# Patient Record
Sex: FEMALE | Race: WHITE | NOT HISPANIC OR LATINO | URBAN - METROPOLITAN AREA
[De-identification: names, ages, dates, MRNs, and addresses within clinical notes are randomized per-mention and may not be internally consistent; named-entity substitution may affect disease eponyms.]

---
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
## 2024-06-29 ENCOUNTER — APPOINTMENT (OUTPATIENT)
Dept: PEDIATRICS | Facility: CLINIC | Age: 19
End: 2024-06-29
Payer: COMMERCIAL

## 2024-07-09 PROBLEM — Z86.16 HISTORY OF SEVERE ACUTE RESPIRATORY SYNDROME CORONAVIRUS 2 (SARS-COV-2) DISEASE: Status: RESOLVED | Noted: 2021-12-14 | Resolved: 2024-07-09

## 2024-07-11 ENCOUNTER — LAB (OUTPATIENT)
Dept: LAB | Facility: LAB | Age: 19
End: 2024-07-11
Payer: COMMERCIAL

## 2024-07-11 ENCOUNTER — APPOINTMENT (OUTPATIENT)
Dept: PEDIATRICS | Facility: CLINIC | Age: 19
End: 2024-07-11
Payer: COMMERCIAL

## 2024-07-11 VITALS — TEMPERATURE: 98.5 F | BODY MASS INDEX: 27.86 KG/M2 | WEIGHT: 154.8 LBS

## 2024-07-11 DIAGNOSIS — Z13.220 LIPID SCREENING: ICD-10-CM

## 2024-07-11 DIAGNOSIS — R10.33 PERIUMBILICAL ABDOMINAL PAIN: ICD-10-CM

## 2024-07-11 DIAGNOSIS — R10.33 PERIUMBILICAL ABDOMINAL PAIN: Primary | ICD-10-CM

## 2024-07-11 DIAGNOSIS — R19.4 FREQUENT BOWEL MOVEMENTS: ICD-10-CM

## 2024-07-11 LAB
ALBUMIN SERPL BCP-MCNC: 4.4 G/DL (ref 3.4–5)
ALP SERPL-CCNC: 82 U/L (ref 33–110)
ALT SERPL W P-5'-P-CCNC: 24 U/L (ref 7–45)
ANION GAP SERPL CALC-SCNC: 16 MMOL/L (ref 10–20)
AST SERPL W P-5'-P-CCNC: 14 U/L (ref 9–39)
BILIRUB SERPL-MCNC: 0.4 MG/DL (ref 0–1.2)
BUN SERPL-MCNC: 8 MG/DL (ref 6–23)
CALCIUM SERPL-MCNC: 9.7 MG/DL (ref 8.6–10.6)
CHLORIDE SERPL-SCNC: 101 MMOL/L (ref 98–107)
CHOLEST SERPL-MCNC: 164 MG/DL (ref 0–199)
CHOLESTEROL/HDL RATIO: 3.3
CO2 SERPL-SCNC: 25 MMOL/L (ref 21–32)
CREAT SERPL-MCNC: 0.58 MG/DL (ref 0.5–1.05)
CRP SERPL-MCNC: 1.3 MG/DL
EGFRCR SERPLBLD CKD-EPI 2021: >90 ML/MIN/1.73M*2
FERRITIN SERPL-MCNC: 33 NG/ML (ref 8–150)
GLUCOSE SERPL-MCNC: 78 MG/DL (ref 74–99)
HDLC SERPL-MCNC: 50 MG/DL
IRON SATN MFR SERPL: 19 % (ref 25–45)
IRON SERPL-MCNC: 81 UG/DL (ref 35–150)
LDLC SERPL CALC-MCNC: 91 MG/DL
NON HDL CHOLESTEROL: 114 MG/DL (ref 0–119)
POTASSIUM SERPL-SCNC: 4.8 MMOL/L (ref 3.5–5.3)
PROT SERPL-MCNC: 7.7 G/DL (ref 6.4–8.2)
SODIUM SERPL-SCNC: 137 MMOL/L (ref 136–145)
TIBC SERPL-MCNC: 433 UG/DL (ref 240–445)
TRIGL SERPL-MCNC: 117 MG/DL (ref 0–149)
UIBC SERPL-MCNC: 352 UG/DL (ref 110–370)
VLDL: 23 MG/DL (ref 0–40)

## 2024-07-11 PROCEDURE — 1036F TOBACCO NON-USER: CPT | Performed by: PEDIATRICS

## 2024-07-11 PROCEDURE — 85652 RBC SED RATE AUTOMATED: CPT

## 2024-07-11 PROCEDURE — 82784 ASSAY IGA/IGD/IGG/IGM EACH: CPT

## 2024-07-11 PROCEDURE — 82728 ASSAY OF FERRITIN: CPT

## 2024-07-11 PROCEDURE — 84443 ASSAY THYROID STIM HORMONE: CPT

## 2024-07-11 PROCEDURE — 99214 OFFICE O/P EST MOD 30 MIN: CPT | Performed by: PEDIATRICS

## 2024-07-11 PROCEDURE — 83550 IRON BINDING TEST: CPT

## 2024-07-11 PROCEDURE — 85025 COMPLETE CBC W/AUTO DIFF WBC: CPT

## 2024-07-11 PROCEDURE — 36415 COLL VENOUS BLD VENIPUNCTURE: CPT

## 2024-07-11 PROCEDURE — 83516 IMMUNOASSAY NONANTIBODY: CPT

## 2024-07-11 PROCEDURE — 80053 COMPREHEN METABOLIC PANEL: CPT

## 2024-07-11 PROCEDURE — 86140 C-REACTIVE PROTEIN: CPT

## 2024-07-11 PROCEDURE — 83540 ASSAY OF IRON: CPT

## 2024-07-11 PROCEDURE — 80061 LIPID PANEL: CPT

## 2024-07-11 ASSESSMENT — ENCOUNTER SYMPTOMS
CONSTIPATION: 0
VOMITING: 0
HEADACHES: 0
NAUSEA: 0
ABDOMINAL PAIN: 1
BELCHING: 0
FLATUS: 1
ARTHRALGIAS: 0
FEVER: 0
DYSURIA: 0

## 2024-07-11 NOTE — PROGRESS NOTES
Dr goodman on the phone with dr gordon   Subjective   Karla Flor is a 18 y.o. female who presents for Abdominal Pain (Stomach pain for about a year/has gotten worse in the past month/Here by herself).  Today she is accompanied by caregiver who is also providing history.    Abdominal Pain  This is a chronic problem. Episode onset: abdominal discomfort started about a year ago and  increased frequency of stools started and increased symptoms with eating started about 6 months ago. The onset quality is gradual. The problem occurs daily. The problem has been gradually worsening since onset. The pain is located in the periumbilical region (all over). The pain is moderate. The quality of the pain is described as aching and cramping (bloating). The pain does not radiate. Associated symptoms include flatus. Pertinent negatives include no arthralgias, belching, constipation, dysuria, fever, headaches, melena, nausea or vomiting. (Looser stools 5+ times per day.  No blood or mucous.    No heartburn or reflux sxs.    Regular periods.  No vaginal discharge. ) The symptoms are relieved by bowel movements (cut out milk and it didn't make a difference). (Dietary habits: is pescatarian for 6 months; eats fish q week, eats eggs q week; no tofu or soy, no high protein grains; minimal nuts; no dairy) Past treatments include nothing. There is no past medical history of GERD.       Objective     Temp 36.9 °C (98.5 °F) (Tympanic)   Wt 70.2 kg (154 lb 12.8 oz)   BMI 27.86 kg/m²     Physical Exam  Vitals reviewed.   Constitutional:       Appearance: Normal appearance.   HENT:      Right Ear: Tympanic membrane normal.      Left Ear: Tympanic membrane normal.      Nose: Nose normal.      Mouth/Throat:      Mouth: Mucous membranes are moist.   Eyes:      Conjunctiva/sclera: Conjunctivae normal.   Cardiovascular:      Rate and Rhythm: Normal rate and regular rhythm.      Heart sounds: Normal heart sounds.   Pulmonary:      Effort: Pulmonary effort is normal.      Breath sounds:  Normal breath sounds.   Abdominal:      General: Abdomen is flat.      Palpations: Abdomen is soft. There is no mass.   Musculoskeletal:      Cervical back: Normal range of motion.   Skin:     General: Skin is warm.      Findings: No rash.   Neurological:      Mental Status: She is alert and oriented to person, place, and time.   Psychiatric:         Mood and Affect: Mood normal.         Assessment/Plan   Karla was seen today for abdominal pain.  Diagnoses and all orders for this visit:  Periumbilical abdominal pain (Primary)  -     CBC and Auto Differential; Future  -     Comprehensive Metabolic Panel; Future  -     C-Reactive Protein; Future  -     IgA; Future  -     Sedimentation Rate; Future  -     Tissue Transglutaminase IgA; Future  -     Ferritin; Future  -     Iron and TIBC; Future  -     TSH with reflex to Free T4 if abnormal; Future  Lipid screening  -     Lipid Panel; Future  Frequent bowel movements  My suspicion is that the blood work is going to come back normal.  I feel that she should see a dietician because her current diet is inadequate in several ways.  I'm not convinced that improving her diet is going to completely cure her abdominal symptoms so Integrative Medicine is a reasonable route as well.    Dieticians and Nutritionists:  [facility]  Joselyn [place]  [phone]  [facility]  [phone]  [place]  Madiha Esquivel  [phone]  [place]            [facility]      [phone] or [phone]    I will call with lab results.  I spent a total of 40 minutes on the date of service which included preparing to see the patient, face to face patient care, completing clinical documentation, obtaining and/or reviewing separately reviewed history, performing a medically appropriate examination, counseling and educating the patient/family/caregiver and ordering medications, tests, or procedures.

## 2024-07-12 LAB
BASOPHILS # BLD AUTO: 0.05 X10*3/UL (ref 0–0.1)
BASOPHILS NFR BLD AUTO: 0.5 %
EOSINOPHIL # BLD AUTO: 0.03 X10*3/UL (ref 0–0.7)
EOSINOPHIL NFR BLD AUTO: 0.3 %
ERYTHROCYTE [DISTWIDTH] IN BLOOD BY AUTOMATED COUNT: 12.1 % (ref 11.5–14.5)
ERYTHROCYTE [SEDIMENTATION RATE] IN BLOOD BY WESTERGREN METHOD: 43 MM/H (ref 0–20)
HCT VFR BLD AUTO: 44 % (ref 36–46)
HGB BLD-MCNC: 14.2 G/DL (ref 12–16)
IGA SERPL-MCNC: 252 MG/DL (ref 70–400)
IMM GRANULOCYTES # BLD AUTO: 0.03 X10*3/UL (ref 0–0.7)
IMM GRANULOCYTES NFR BLD AUTO: 0.3 % (ref 0–0.9)
LYMPHOCYTES # BLD AUTO: 2.64 X10*3/UL (ref 1.2–4.8)
LYMPHOCYTES NFR BLD AUTO: 23.9 %
MCH RBC QN AUTO: 29 PG (ref 26–34)
MCHC RBC AUTO-ENTMCNC: 32.3 G/DL (ref 32–36)
MCV RBC AUTO: 90 FL (ref 80–100)
MONOCYTES # BLD AUTO: 0.53 X10*3/UL (ref 0.1–1)
MONOCYTES NFR BLD AUTO: 4.8 %
NEUTROPHILS # BLD AUTO: 7.78 X10*3/UL (ref 1.2–7.7)
NEUTROPHILS NFR BLD AUTO: 70.2 %
NRBC BLD-RTO: 0 /100 WBCS (ref 0–0)
PLATELET # BLD AUTO: 433 X10*3/UL (ref 150–450)
RBC # BLD AUTO: 4.89 X10*6/UL (ref 4–5.2)
TSH SERPL-ACNC: 1.13 MIU/L (ref 0.44–3.98)
TTG IGA SER IA-ACNC: <1 U/ML
WBC # BLD AUTO: 11.1 X10*3/UL (ref 4.4–11.3)

## 2024-07-19 ENCOUNTER — TELEPHONE (OUTPATIENT)
Dept: PEDIATRICS | Facility: CLINIC | Age: 19
End: 2024-07-19
Payer: COMMERCIAL

## 2024-07-19 DIAGNOSIS — R19.7 DIARRHEA, UNSPECIFIED TYPE: ICD-10-CM

## 2024-07-19 DIAGNOSIS — R10.33 PERIUMBILICAL ABDOMINAL PAIN: Primary | ICD-10-CM

## 2024-07-31 ENCOUNTER — OFFICE VISIT (OUTPATIENT)
Dept: GASTROENTEROLOGY | Facility: CLINIC | Age: 19
End: 2024-07-31
Payer: COMMERCIAL

## 2024-07-31 VITALS
HEIGHT: 62 IN | BODY MASS INDEX: 29.26 KG/M2 | HEART RATE: 75 BPM | RESPIRATION RATE: 16 BRPM | DIASTOLIC BLOOD PRESSURE: 77 MMHG | SYSTOLIC BLOOD PRESSURE: 121 MMHG | OXYGEN SATURATION: 98 % | WEIGHT: 159 LBS

## 2024-07-31 DIAGNOSIS — R10.33 PERIUMBILICAL ABDOMINAL PAIN: ICD-10-CM

## 2024-07-31 DIAGNOSIS — R14.0 BLOATING: ICD-10-CM

## 2024-07-31 DIAGNOSIS — R19.5 LOOSE STOOLS: Primary | ICD-10-CM

## 2024-07-31 PROCEDURE — 3008F BODY MASS INDEX DOCD: CPT | Performed by: STUDENT IN AN ORGANIZED HEALTH CARE EDUCATION/TRAINING PROGRAM

## 2024-07-31 PROCEDURE — 1036F TOBACCO NON-USER: CPT | Performed by: STUDENT IN AN ORGANIZED HEALTH CARE EDUCATION/TRAINING PROGRAM

## 2024-07-31 PROCEDURE — 99204 OFFICE O/P NEW MOD 45 MIN: CPT | Performed by: STUDENT IN AN ORGANIZED HEALTH CARE EDUCATION/TRAINING PROGRAM

## 2024-07-31 NOTE — PROGRESS NOTES
"CC: Abdominal pain, bloating, loose stools/frequent BMs,     History of Present Illness:   Karla Flor is a 18 y.o. female with no significant PMH who presents to clinic for abdominal pain, bloating, and frequent bowel movements/loose stools.  She will have 4-6 bowel movements daily.  They are mushy in consistency.  She has generalized abdominal pain.  It sometimes improves with a bowel movement.  Celiac studies were normal. Serum inflammatory markers were elevated. Stress levels are mild to moderate. No anxiety. Does not consume dairy, because it causes symptoms. Drinks a \"healthy version\" of pop (twice weekly). No juice. Eats fruits and vegetables. Has not tried any medications. Not on any medications at baseline except birth control. 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  Drinks on occasion at school. No tobacco use.     Review of Systems  ROS Negative unless otherwise stated above.    Past Medical/Surgical History  Past Medical History:   Diagnosis Date    History of severe acute respiratory syndrome coronavirus 2 (SARS-CoV-2) disease 12/14/2021      No past surgical history on file.     Social History   reports that she has never smoked. She has never used smokeless tobacco.     Family History  family history is not on file.     Allergies  No Known Allergies    Medications  Current Outpatient Medications   Medication Instructions    Nortrel 0.5/35, 28, 0.5-35 mg-mcg tablet 1 tablet, oral, Daily        Objective   Visit Vitals  /77   Pulse 75   Resp 16        General: A&Ox3, NAD.  HEENT: AT/NC.   CV: RRR. No murmur.  Resp: CTA bilaterally. No wheezing, rhonchi or rales.   GI: Soft, NT/ND.   Extrem: No edema. Pulses intact.  Skin: No Jaundice.   Neuro: No focal deficits.   Psych: Normal mood and affect.     Lab Results   Component Value Date    WBC 11.1 07/11/2024    HGB 14.2 07/11/2024    HCT 44.0 07/11/2024    MCV 90 07/11/2024     07/11/2024       Chemistry    Lab " Results   Component Value Date/Time     07/11/2024 1520    K 4.8 07/11/2024 1520     07/11/2024 1520    CO2 25 07/11/2024 1520    BUN 8 07/11/2024 1520    CREATININE 0.58 07/11/2024 1520    Lab Results   Component Value Date/Time    CALCIUM 9.7 07/11/2024 1520    ALKPHOS 82 07/11/2024 1520    AST 14 07/11/2024 1520    ALT 24 07/11/2024 1520    BILITOT 0.4 07/11/2024 1520             ASSESSMENT/PLAN  Karla Flor is a 18 y.o. female with no significant PMH who presents to clinic for abdominal pain, bloating, and frequent bowel movements/loose stools.  Normal celiac studies, no anemia.  Serum inflammatory markers are elevated.    -Follow-up stool infectious studies.  -Obtain fecal calprotectin.  -Start fiber supplementation 1-2 scoops daily and titrate as needed.  -Keep food journal.  -Consider low FODMAP diet.  -Avoid pop, dairy, concentrated sugars, carbonated beverages.  -Stress reduction, good sleep hygiene, daily exercise.  -Consider colonoscopy pending clinical course.      Haja Mathias, DO

## 2024-08-02 ENCOUNTER — LAB (OUTPATIENT)
Dept: LAB | Facility: LAB | Age: 19
End: 2024-08-02
Payer: COMMERCIAL

## 2024-08-02 DIAGNOSIS — R19.7 DIARRHEA, UNSPECIFIED TYPE: ICD-10-CM

## 2024-08-02 DIAGNOSIS — R14.0 BLOATING: ICD-10-CM

## 2024-08-02 DIAGNOSIS — R19.5 LOOSE STOOLS: ICD-10-CM

## 2024-08-02 DIAGNOSIS — R10.33 PERIUMBILICAL ABDOMINAL PAIN: ICD-10-CM

## 2024-08-02 PROCEDURE — 87329 GIARDIA AG IA: CPT

## 2024-08-02 PROCEDURE — 87506 IADNA-DNA/RNA PROBE TQ 6-11: CPT

## 2024-08-02 PROCEDURE — 83993 ASSAY FOR CALPROTECTIN FECAL: CPT

## 2024-08-05 LAB

## 2024-08-06 LAB — G LAMBLIA AG STL QL IA: NEGATIVE

## 2024-08-07 LAB — CALPROTECTIN STL-MCNT: <5 UG/G

## 2024-08-09 ENCOUNTER — APPOINTMENT (OUTPATIENT)
Dept: PEDIATRICS | Facility: CLINIC | Age: 19
End: 2024-08-09
Payer: COMMERCIAL

## 2024-09-06 ENCOUNTER — APPOINTMENT (OUTPATIENT)
Dept: INTEGRATIVE MEDICINE | Facility: HOSPITAL | Age: 19
End: 2024-09-06
Payer: COMMERCIAL

## 2024-12-11 DIAGNOSIS — N94.6 DYSMENORRHEA: ICD-10-CM

## 2024-12-14 RX ORDER — NORETHINDRONE AND ETHINYL ESTRADIOL 0.5-0.035
1 KIT ORAL DAILY
Qty: 84 TABLET | Refills: 0 | Status: SHIPPED | OUTPATIENT
Start: 2024-12-14

## 2024-12-18 ENCOUNTER — TELEPHONE (OUTPATIENT)
Dept: PEDIATRICS | Facility: CLINIC | Age: 19
End: 2024-12-18
Payer: COMMERCIAL

## 2024-12-19 RX ORDER — FLUCONAZOLE 150 MG/1
TABLET ORAL
COMMUNITY
Start: 2024-11-13

## 2024-12-19 RX ORDER — CIPROFLOXACIN AND DEXAMETHASONE 3; 1 MG/ML; MG/ML
SUSPENSION/ DROPS AURICULAR (OTIC)
COMMUNITY
Start: 2024-11-10

## 2024-12-19 RX ORDER — CEPHALEXIN 500 MG/1
CAPSULE ORAL
COMMUNITY
Start: 2024-09-28

## 2024-12-20 ENCOUNTER — OFFICE VISIT (OUTPATIENT)
Dept: PEDIATRICS | Facility: CLINIC | Age: 19
End: 2024-12-20
Payer: COMMERCIAL

## 2024-12-20 VITALS — TEMPERATURE: 98.2 F | BODY MASS INDEX: 28.61 KG/M2 | WEIGHT: 156.4 LBS

## 2024-12-20 DIAGNOSIS — H61.893 EAR CANAL DRYNESS, BILATERAL: Primary | ICD-10-CM

## 2024-12-20 PROCEDURE — 99213 OFFICE O/P EST LOW 20 MIN: CPT | Performed by: PEDIATRICS

## 2024-12-20 NOTE — PROGRESS NOTES
Subjective   Karla Flor is a 19 y.o. female who presents for Other (Here by herself /C/O Ear Ache ).  Today she is accompanied by caregiver who is also providing history.    In September went to [facility].  Treated.  In October went to  - swollen LAC gland and EO on right.  Drops.  Sometimes has itchy canals.   Left ear intermittently pops.  Currently well.  No fever.  No uri. No allergies.        Objective     Temp 36.8 °C (98.2 °F) (Tympanic)   Wt 70.9 kg (156 lb 6.4 oz)   BMI 28.61 kg/m²     Physical Exam  Vitals reviewed.   Constitutional:       Appearance: Normal appearance.   HENT:      Right Ear: Tympanic membrane normal.      Left Ear: Tympanic membrane normal.      Nose: Nose normal.      Mouth/Throat:      Mouth: Mucous membranes are moist.   Eyes:      Conjunctiva/sclera: Conjunctivae normal.   Cardiovascular:      Rate and Rhythm: Normal rate and regular rhythm.      Heart sounds: Normal heart sounds.   Pulmonary:      Effort: Pulmonary effort is normal.      Breath sounds: Normal breath sounds.   Abdominal:      General: Abdomen is flat.      Palpations: Abdomen is soft. There is no mass.   Musculoskeletal:      Cervical back: Normal range of motion.   Skin:     General: Skin is warm.      Findings: No rash.   Neurological:      Mental Status: She is alert and oriented to person, place, and time.   Psychiatric:         Mood and Affect: Mood normal.         Assessment/Plan   Karla was seen today for other.  Diagnoses and all orders for this visit:  Ear canal dryness, bilateral (Primary)  Can use alcohol/white vinegar mix.  Keep in canals x 5 minutes.  Do q week. Discussed lymph node - which is not concerning.  Discussed ear popping - try and stop using straw water bottle.

## 2025-02-17 ENCOUNTER — APPOINTMENT (OUTPATIENT)
Dept: PEDIATRICS | Facility: CLINIC | Age: 20
End: 2025-02-17
Payer: COMMERCIAL

## 2025-02-17 VITALS
HEIGHT: 63 IN | HEART RATE: 88 BPM | WEIGHT: 163.19 LBS | BODY MASS INDEX: 28.91 KG/M2 | SYSTOLIC BLOOD PRESSURE: 121 MMHG | DIASTOLIC BLOOD PRESSURE: 86 MMHG

## 2025-02-17 DIAGNOSIS — Z13.9 SCREENING PROCEDURE: ICD-10-CM

## 2025-02-17 DIAGNOSIS — Z00.00 WELLNESS EXAMINATION: Primary | ICD-10-CM

## 2025-02-17 DIAGNOSIS — N94.6 DYSMENORRHEA: ICD-10-CM

## 2025-02-17 DIAGNOSIS — N92.0 MENORRHAGIA WITH REGULAR CYCLE: ICD-10-CM

## 2025-02-17 DIAGNOSIS — Z23 NEED FOR INFLUENZA VACCINATION: ICD-10-CM

## 2025-02-17 PROCEDURE — 96127 BRIEF EMOTIONAL/BEHAV ASSMT: CPT | Performed by: PEDIATRICS

## 2025-02-17 PROCEDURE — 90471 IMMUNIZATION ADMIN: CPT | Performed by: PEDIATRICS

## 2025-02-17 PROCEDURE — 99395 PREV VISIT EST AGE 18-39: CPT | Performed by: PEDIATRICS

## 2025-02-17 PROCEDURE — 90656 IIV3 VACC NO PRSV 0.5 ML IM: CPT | Performed by: PEDIATRICS

## 2025-02-17 PROCEDURE — 1036F TOBACCO NON-USER: CPT | Performed by: PEDIATRICS

## 2025-02-17 PROCEDURE — 3008F BODY MASS INDEX DOCD: CPT | Performed by: PEDIATRICS

## 2025-02-17 RX ORDER — NORETHINDRONE AND ETHINYL ESTRADIOL 0.5-0.035
1 KIT ORAL DAILY
Qty: 84 TABLET | Refills: 3 | Status: SHIPPED | OUTPATIENT
Start: 2025-02-17

## 2025-02-17 RX ORDER — FLUTICASONE PROPIONATE 0.5 MG/G
CREAM TOPICAL
COMMUNITY
Start: 2024-12-20

## 2025-02-17 NOTE — PROGRESS NOTES
"Subjective   History was provided by Karla.  Karla Flor is a 19 y.o. female who is here for this well visit.      Current Issues:  Current concerns: saw derm today.  Would like to find some birth control other than OCP.  Just tired of taking the pill every day.   Vision or hearing concerns? no  Dental care up to date? Yes- brushes teeth 2 times/day, regular dental visits, does floss teeth   No significant recent health issues.   Specialist visits - none    Review of Nutrition, Elimination, and Sleep:  Current diet:  3 meals/day, diet well balanced, normal portions, <8oz. sugar containing beverages daily, adequate dairy intake, diet includes fruits and vegetables and protein.  Elimination: normal bowel movement frequency, normal consistency   Sleep: has structured bedtime routine, sleeps through the night, no trouble getting up    School and Social Screening:  School: [employer]  Work: will look for a job for this summer.   Activities: yes  Supportive social network. Current relationship - yes    Sports Participation Screening:  Gets regular exercise.    Screening Questions:  Other: normal mood, satisfied with body weight  Risk factors for dyslipidemia: no  Risk factors for sexually-transmitted infections:   Sexually active: yes - in relationship    Using condoms: Yes  Substance use:  Smoking - No  Vaping - No  Drinking - Yes  Drugs - No  Genitourinary:  normal menses - no issues    Objective   /86   Pulse 88   Ht 1.588 m (5' 2.5\")   Wt 74 kg (163 lb 3 oz)   BMI 29.37 kg/m²   Growth parameters are noted and are appropriate for age.    Physical Exam  Constitutional:       Appearance: Normal appearance.   HENT:      Right Ear: Tympanic membrane normal.      Left Ear: Tympanic membrane normal.      Nose: Nose normal.      Mouth/Throat:      Mouth: Mucous membranes are moist.      Pharynx: Oropharynx is clear.   Eyes:      Extraocular Movements: Extraocular movements intact.   Cardiovascular:      Rate and Rhythm: " Normal rate and regular rhythm.      Pulses:           Femoral pulses are 2+ on the right side and 2+ on the left side.     Heart sounds: No murmur heard.  Pulmonary:      Effort: Pulmonary effort is normal.      Breath sounds: Normal breath sounds.   Chest:   Breasts:     Nicolas Score is 5.      Breasts are symmetrical.   Abdominal:      General: Abdomen is flat.      Palpations: Abdomen is soft. There is no hepatomegaly, splenomegaly or mass.   Genitourinary:     Comments: Pt declines exam  Musculoskeletal:         General: Normal range of motion.      Cervical back: Normal range of motion and neck supple.      Thoracic back: No scoliosis.      Lumbar back: No scoliosis.   Lymphadenopathy:      Cervical: No cervical adenopathy.   Skin:     General: Skin is warm.      Findings: No acne.   Neurological:      General: No focal deficit present.      Mental Status: She is alert.      Deep Tendon Reflexes:      Reflex Scores:       Patellar reflexes are 2+ on the right side and 2+ on the left side.  Psychiatric:         Mood and Affect: Mood normal.         Behavior: Behavior normal.         Assessment/Plan   Karla was seen today for well child.  Diagnoses and all orders for this visit:  Wellness examination (Primary)  Screening procedure  -     C. trachomatis / N. gonorrhoeae, Amplified, Urogenital  Dysmenorrhea  -     norethindrone-ethin estradioL (Nortrel 0.5/35, 28,) 0.5-35 mg-mcg tablet; Take 1 tablet by mouth once daily.  Need for influenza vaccination  -     Flu vaccine, trivalent, preservative free, age 6 months and greater (Fluraix/Fluzone/Flulaval)  Menorrhagia with regular cycle    Well young adult.  - Anticipatory guidance discussed.   - Injury prevention: wearing seatbelt, understanding sun protection, understanding conflict resolution/violence prevention,  reviewed driving safety    -Risk Taking: cardiac risk factors reviewed, alcohol, drug and tobacco use reviewed, reviewed internet safety      - Growth  and weight gain appropriate. The patient was counseled regarding nutrition and physical activity.  - Development: appropriate for age  - Get in the habit of monthly breast exams.    - Immunizations as ordered.  All vaccines given at today’s visit were reviewed with the family. Risks/benefits/side effects discussed and VIS sheet provided. All questions answered. Given with consent   - Follow up in 1 year for next well child exam or sooner with concerns.    Problem List Items Addressed This Visit       Menorrhagia     Referred to gyn          Other Visit Diagnoses       Wellness examination    -  Primary    Screening procedure        Relevant Orders    C. trachomatis / N. gonorrhoeae, Amplified, Urogenital    Dysmenorrhea        Relevant Medications    norethindrone-ethin estradioL (Nortrel 0.5/35, 28,) 0.5-35 mg-mcg tablet    Need for influenza vaccination        Relevant Orders    Flu vaccine, trivalent, preservative free, age 6 months and greater (Fluraix/Fluzone/Flulaval) (Completed)

## 2025-02-18 LAB
C TRACH RRNA SPEC QL NAA+PROBE: NOT DETECTED
N GONORRHOEA RRNA SPEC QL NAA+PROBE: NOT DETECTED
QUEST GC CT AMPLIFIED (ALWAYS MESSAGE): NORMAL

## 2025-03-11 ENCOUNTER — APPOINTMENT (OUTPATIENT)
Dept: OBSTETRICS AND GYNECOLOGY | Facility: CLINIC | Age: 20
End: 2025-03-11
Payer: COMMERCIAL

## 2025-03-18 ENCOUNTER — APPOINTMENT (OUTPATIENT)
Dept: OBSTETRICS AND GYNECOLOGY | Facility: CLINIC | Age: 20
End: 2025-03-18
Payer: COMMERCIAL

## 2025-03-18 VITALS
DIASTOLIC BLOOD PRESSURE: 60 MMHG | HEIGHT: 64 IN | WEIGHT: 165.4 LBS | BODY MASS INDEX: 28.24 KG/M2 | SYSTOLIC BLOOD PRESSURE: 116 MMHG

## 2025-03-18 DIAGNOSIS — Z11.3 SCREEN FOR STD (SEXUALLY TRANSMITTED DISEASE): ICD-10-CM

## 2025-03-18 DIAGNOSIS — N93.9 ABNORMAL UTERINE BLEEDING (AUB): ICD-10-CM

## 2025-03-18 DIAGNOSIS — Z30.430 ENCOUNTER FOR INSERTION OF MIRENA IUD: ICD-10-CM

## 2025-03-18 LAB — PREGNANCY TEST URINE, POC: NEGATIVE

## 2025-03-18 PROCEDURE — 1036F TOBACCO NON-USER: CPT | Performed by: ADVANCED PRACTICE MIDWIFE

## 2025-03-18 PROCEDURE — 58300 INSERT INTRAUTERINE DEVICE: CPT | Performed by: ADVANCED PRACTICE MIDWIFE

## 2025-03-18 PROCEDURE — 3008F BODY MASS INDEX DOCD: CPT | Performed by: ADVANCED PRACTICE MIDWIFE

## 2025-03-18 PROCEDURE — 81025 URINE PREGNANCY TEST: CPT | Performed by: ADVANCED PRACTICE MIDWIFE

## 2025-03-18 RX ORDER — AMOXICILLIN AND CLAVULANATE POTASSIUM 875; 125 MG/1; MG/1
1 TABLET, FILM COATED ORAL
COMMUNITY
Start: 2025-03-09

## 2025-03-18 RX ORDER — BUPIVACAINE HYDROCHLORIDE 2.5 MG/ML
10 INJECTION, SOLUTION INFILTRATION; PERINEURAL ONCE
Status: COMPLETED | OUTPATIENT
Start: 2025-03-18 | End: 2025-03-18

## 2025-03-18 RX ORDER — DEXAMETHASONE 4 MG/1
1 TABLET ORAL
COMMUNITY
Start: 2025-03-09

## 2025-03-18 RX ORDER — DESONIDE 0.5 MG/G
CREAM TOPICAL
COMMUNITY
Start: 2025-02-17

## 2025-03-18 RX ORDER — ALBUTEROL SULFATE 90 UG/1
2 INHALANT RESPIRATORY (INHALATION)
COMMUNITY
Start: 2025-03-09

## 2025-03-18 RX ORDER — METRONIDAZOLE TOPICAL 7.5 MG/G
GEL TOPICAL
COMMUNITY
Start: 2025-02-17

## 2025-03-18 RX ADMIN — BUPIVACAINE HYDROCHLORIDE 25 MG: 2.5 INJECTION, SOLUTION INFILTRATION; PERINEURAL at 14:36

## 2025-03-18 ASSESSMENT — PATIENT HEALTH QUESTIONNAIRE - PHQ9
2. FEELING DOWN, DEPRESSED OR HOPELESS: NOT AT ALL
SUM OF ALL RESPONSES TO PHQ9 QUESTIONS 1 AND 2: 0
1. LITTLE INTEREST OR PLEASURE IN DOING THINGS: NOT AT ALL

## 2025-03-18 NOTE — PROGRESS NOTES
IUD INSERTION PROCEDURE NOTE    Patient's pre-procedure questions were answered and oral consent was obtained.  Urine HCG was  negative.  A speculum was placed in the vagina and the cervix was then cleansed with Betadine. Cervical block done with 5cc of 0.25% Bupivacaine at 4 oclock and 8 oclock.  A single-tooth tenaculum was placed on the anterior lip of the cervix.  The uterus was sounded.  The introducer was then inserted up into the uterus to approximately a centimeter below the uterine fundus.  The Mirena IUD was then unloaded from the introducer near the fundus and then the introducer was then removed.  The IUD strings were cut to 2.5 cm.  The tenaculum was removed from the cervix and the sites were hemostatic, the speculum was then removed.  The patient tolerated the procedure well.  There were no complications.  Blood loss was minimal.      F/U PRN or when next preventative GYN examination due.      Chrissie Duke, MARIA VICTORIA-CHRIS

## 2025-03-19 LAB
C TRACH RRNA SPEC QL NAA+PROBE: NOT DETECTED
N GONORRHOEA RRNA SPEC QL NAA+PROBE: NOT DETECTED
QUEST GC CT AMPLIFIED (ALWAYS MESSAGE): NORMAL
T VAGINALIS RRNA SPEC QL NAA+PROBE: NOT DETECTED

## 2025-06-03 ENCOUNTER — OFFICE VISIT (OUTPATIENT)
Dept: OBSTETRICS AND GYNECOLOGY | Facility: CLINIC | Age: 20
End: 2025-06-03
Payer: COMMERCIAL

## 2025-06-03 VITALS — SYSTOLIC BLOOD PRESSURE: 110 MMHG | WEIGHT: 162.9 LBS | BODY MASS INDEX: 27.96 KG/M2 | DIASTOLIC BLOOD PRESSURE: 78 MMHG

## 2025-06-03 DIAGNOSIS — Z97.5 BREAKTHROUGH BLEEDING ASSOCIATED WITH INTRAUTERINE DEVICE (IUD): Primary | ICD-10-CM

## 2025-06-03 DIAGNOSIS — N92.1 BREAKTHROUGH BLEEDING ASSOCIATED WITH INTRAUTERINE DEVICE (IUD): Primary | ICD-10-CM

## 2025-06-03 PROCEDURE — 99212 OFFICE O/P EST SF 10 MIN: CPT | Performed by: ADVANCED PRACTICE MIDWIFE

## 2025-06-03 RX ORDER — ESTRADIOL 1 MG/1
1 TABLET ORAL DAILY
Qty: 30 TABLET | Refills: 1 | Status: SHIPPED | OUTPATIENT
Start: 2025-06-03 | End: 2026-06-03

## 2025-06-03 NOTE — PROGRESS NOTES
GYNECOLOGY PROGRESS NOTE          CC:   Patient seen 3/18/2025 for Mirena IUD insertion. After the insertion she has some bleeding for 1 week then it stopped. Then she had another menses that has now lasted for 3 weeks. It is light and spotting but still daily. No pain.   Chief Complaint   Patient presents with    Vaginal Bleeding     Bleeding on Mirena          HPI:  Karla Flor is here with new complaint of spotting x 3weeks.      ROS:  GYN - see HPI        PHYSICAL EXAM:  /78   Wt 73.9 kg (162 lb 14.4 oz)   BMI 27.96 kg/m²   GEN:  A&O, NAD  HEENT:  head HC/AT, no visible goiter  PSYCH:  normal affect, non-anxious      IMPRESSION/PLAN:  A: bleeding with IUD in place   Plan: 1. Start add back estrogen x 30 days then stop. IF prolonged spotting continues or doesn't stop then consider 1-3 months of OCPs. Patient will call if bleeding resumes/doesn't stop or is prolonged again.  Problem List Items Addressed This Visit    None         NEVAEH Mcclure

## 2025-07-17 DIAGNOSIS — Z97.5 BREAKTHROUGH BLEEDING WITH IUD: Primary | ICD-10-CM

## 2025-07-17 DIAGNOSIS — N92.1 BREAKTHROUGH BLEEDING WITH IUD: Primary | ICD-10-CM

## 2025-07-17 RX ORDER — NORETHINDRONE ACETATE AND ETHINYL ESTRADIOL 1MG-20(21)
1 KIT ORAL DAILY
Qty: 84 TABLET | Refills: 0 | Status: SHIPPED | OUTPATIENT
Start: 2025-07-17 | End: 2026-07-17

## 2025-08-18 DIAGNOSIS — Z97.5 BREAKTHROUGH BLEEDING WITH IUD: ICD-10-CM

## 2025-08-18 DIAGNOSIS — N92.1 BREAKTHROUGH BLEEDING WITH IUD: ICD-10-CM

## 2025-08-18 RX ORDER — NORETHINDRONE ACETATE AND ETHINYL ESTRADIOL 1MG-20(21)
1 KIT ORAL DAILY
Qty: 84 TABLET | Refills: 0 | Status: SHIPPED | OUTPATIENT
Start: 2025-08-18 | End: 2026-08-18